# Patient Record
Sex: MALE | Race: WHITE
[De-identification: names, ages, dates, MRNs, and addresses within clinical notes are randomized per-mention and may not be internally consistent; named-entity substitution may affect disease eponyms.]

---

## 2018-11-28 ENCOUNTER — HOSPITAL ENCOUNTER (EMERGENCY)
Dept: HOSPITAL 25 - UCEAST | Age: 23
Discharge: HOME | End: 2018-11-28
Payer: COMMERCIAL

## 2018-11-28 VITALS — SYSTOLIC BLOOD PRESSURE: 138 MMHG | DIASTOLIC BLOOD PRESSURE: 76 MMHG

## 2018-11-28 DIAGNOSIS — R21: Primary | ICD-10-CM

## 2018-11-28 PROCEDURE — 85025 COMPLETE CBC W/AUTO DIFF WBC: CPT

## 2018-11-28 PROCEDURE — 86140 C-REACTIVE PROTEIN: CPT

## 2018-11-28 PROCEDURE — 80053 COMPREHEN METABOLIC PANEL: CPT

## 2018-11-28 PROCEDURE — 99201: CPT

## 2018-11-28 PROCEDURE — G0463 HOSPITAL OUTPT CLINIC VISIT: HCPCS

## 2018-11-28 PROCEDURE — 36415 COLL VENOUS BLD VENIPUNCTURE: CPT

## 2018-11-28 NOTE — UC
Skin Complaint HPI





- HPI Summary


HPI Summary: 





23-year-old male comes to clinic today with a chief complaint of rash.  Started 

a week ago.  Started in the left elbow antecubital region.  It does itch.  It 

since spread to the inner thighs bilaterally and down on the ankles.  He has 

been using calamine lotion which helps decrease the itch.  No fevers or chills 

feels well.  No sore throat. No known new food her close or exposures.





- History of Current Complaint


Chief Complaint: UCRash


Time Seen by Provider: 11/28/18 19:42


Stated Complaint: RASH


Pain Intensity: 0





Review of Systems


All Other Systems Reviewed And Are Negative: Yes


Constitutional: Positive: Negative


Skin: Positive: Rash


Eyes: Positive: Negative


ENT: Positive: Negative


Respiratory: Positive: Negative


Cardiovascular: Positive: Negative


Gastrointestinal: Positive: Negative


Motor: Positive: Negative


Neurovascular: Positive: Negative


Musculoskeletal: Positive: Negative


Neurological: Positive: Negative


Psychological: Positive: Negative


Is Patient Immunocompromised?: No





PMH/Surg Hx/FS Hx/Imm Hx


Previously Healthy: Yes





- Surgical History


Surgical History: None





- Family History


Known Family History: Positive: Non-Contributory





- Social History


Alcohol Use: Occasionally


Substance Use Type: None


Smoking Status (MU): Never Smoked Tobacco





Physical Exam


Triage Information Reviewed: Yes


Appearance: Well-Appearing, No Pain Distress, Well-Nourished


Vital Signs: 


 Initial Vital Signs











Temp  99.8 F   11/28/18 19:40


 


Pulse  81   11/28/18 19:40


 


Resp  18   11/28/18 19:40


 


BP  138/76   11/28/18 19:40


 


Pulse Ox  99   11/28/18 19:40











Vital Signs Reviewed: Yes


Eye Exam: Normal


Eyes: Positive: Conjunctiva Clear


ENT: Negative: Nasal congestion, Nasal drainage


Neck exam: Normal


Neck: Positive: Supple


Respiratory: Positive: No respiratory distress


Musculoskeletal Exam: Normal


Musculoskeletal: Positive: Strength Intact, ROM Intact


Neurological Exam: Normal


Neurological: Positive: Alert, Muscle Tone Normal


Psychological Exam: Normal


Psychological: Positive: Age Appropriate Behavior


Skin: Positive: Other - Patient has large patches of erythematous flat rash.  

The left antecubital is 6 cm x 4 cm.  It is spread on both inner thighs are 

without to the knees.  It is circumferential around the ankles.  Blanches.  It 

is not hot to touch there is no drainage.  No scaling.





Course/Dx





- Course


Course Of Treatment: We will treat with a course of steroids and follow-up with 

dermatology.  Patient is not ill he's not had any sore throat.  Most probably 

allergic reaction to something environmental.  We are checking a CRP CBC and 

CMP.  I let the patient know if he feels ill or has any other current concerns 

he should get weak reevaluated right away.





- Diagnoses


Provider Diagnosis: 


 Rash








Discharge





- Sign-Out/Discharge


Documenting (check all that apply): Patient Departure


All imaging exams completed and their final reports reviewed: No Studies





- Discharge Plan


Condition: Stable


Disposition: HOME


Prescriptions: 


predniSONE TAB* [Deltasone 20 MG TAB*] 40 mg PO DAILY #10 tab


Patient Education Materials:  Acute Rash (ED)


Referrals: 


[facility] – [place], [place] PHYSICIAN REFERRAL [Outside]


Chris Gonzalez MD [Medical Doctor] - 


Additional Instructions: 


FOLLOW UP WITH YOUR DERMATOLOGY.


GET RECHECKED FOR ANY WORSENING OF YOUR CONDITION; PAIN, FEVER, YOU FEEL ILL OR 

QUESTIONS OR CONCERNS.








- Billing Disposition and Condition


Condition: STABLE


Disposition: Home

## 2018-11-29 LAB
BASOPHILS # BLD AUTO: 0 10^3/UL (ref 0–0.2)
EOSINOPHIL # BLD AUTO: 0.7 10^3/UL (ref 0–0.6)
HCT VFR BLD AUTO: 43 % (ref 42–52)
HGB BLD-MCNC: 14.9 G/DL (ref 14–18)
LYMPHOCYTES # BLD AUTO: 1.5 10^3/UL (ref 1–4.8)
MCH RBC QN AUTO: 32 PG (ref 27–31)
MCHC RBC AUTO-ENTMCNC: 35 G/DL (ref 31–36)
MCV RBC AUTO: 91 FL (ref 80–94)
MONOCYTES # BLD AUTO: 0.6 10^3/UL (ref 0–0.8)
NEUTROPHILS # BLD AUTO: 2.4 10^3/UL (ref 1.5–7.7)
NRBC # BLD AUTO: 0 10^3/UL
NRBC BLD QL AUTO: 0.1
PLATELET # BLD AUTO: 229 10^3/UL (ref 150–450)
RBC # BLD AUTO: 4.72 10^6/UL (ref 4–5.4)
WBC # BLD AUTO: 5.3 10^3/UL (ref 3.5–10.8)